# Patient Record
Sex: MALE | Race: BLACK OR AFRICAN AMERICAN
[De-identification: names, ages, dates, MRNs, and addresses within clinical notes are randomized per-mention and may not be internally consistent; named-entity substitution may affect disease eponyms.]

---

## 2018-07-28 ENCOUNTER — HOSPITAL ENCOUNTER (INPATIENT)
Dept: HOSPITAL 74 - JER | Age: 23
Discharge: LEFT BEFORE DISCHARGE | DRG: 605 | End: 2018-07-28
Payer: COMMERCIAL

## 2018-07-28 VITALS — TEMPERATURE: 98.2 F | DIASTOLIC BLOOD PRESSURE: 100 MMHG | SYSTOLIC BLOOD PRESSURE: 146 MMHG | HEART RATE: 102 BPM

## 2018-07-28 VITALS — BODY MASS INDEX: 34.9 KG/M2
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
## 2018-07-28 DIAGNOSIS — S61.213A: Primary | ICD-10-CM
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
## 2018-07-28 DIAGNOSIS — S61.215A: ICD-10-CM
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
## 2018-07-28 NOTE — PDOC
Attending Attestation





- Resident


Resident Name: Marc Mireles





- ED Attending Attestation


I have performed the following: I have examined & evaluated the patient, The 

case was reviewed & discussed with the resident, I agree w/resident's findings 

& plan, Exceptions are as noted





- Medical Decision Making





07/28/18 01:11





22 year old M c/ hx of asthma, right hand dominant p/w left 3rd and 4th digit 

laceration.


Pt was cooking stir ribeiro and cutting when he accidentally cut his left 3rd, 4th 

digit with knife.


Pt sustained injury distal to 3rd DIP with approx 1 cm laceration. Able to flex 

and extend the digit. 


Sustained another laceration distal to 4th DIP approx 1 cm. however, unable to 

flex or extend the digit.


Last tetanus 2013. This occurred prior to arrival.





Pt likely with a tendon injury to the 4th digit.


Will irrigate and clean wound.


Update with tetanus.


IV ancef.


Hand consultation.





<Jaylen Hernandez - Last Filed: 07/28/18 01:11>





- HPI


HPI: 





07/28/18 01:39


The patient is a 22 year old male, with a significant past medical history of 

asthma, who presents to the emergency department with, a laceration of the 3rd 

and 4th digit laceration. As per patient, 20 minutes prior to his arrival he 

was cooking when he cut his fingers with a clean kitchen knife. He denies 

washing the wound after the laceration. His last tetanus shot was in 2013. The 

patient is right hand dominant. 





He denies any recent fevers, chills, headache or dizziness. He denies any 

recent nausea, vomit, diarrhea or constipation. He denies any recent chest pain 

or shortness of breath. He denies any recent dysuria, frequency, urgency or 

hematuria.





Past surgical history: None reported.


Social History: Nonsmoker. Denies EtOH use and recreational drug use. 








- Physicial Exam


PE: 





07/28/18 01:39


GENERAL: Awake, alert, and fully oriented, in no acute distress


HEAD: No signs of trauma


EXTREMITIES: Normal range of motion of bilateral upper and lower extremities.


+RIGHT HAND: 1cm laceration to the 3rd DIP. Able to flex and extend. 1cm 

laceration to the 4th DIP without ability to flex and extend. 2+ radial pulses.


NEUROLOGICAL: Normal speech, normal gait


SKIN: Warm, Dry, normal turgor, no rashes.








- Medical Decision Making








07/28/18 12:57am


Call placed to Dr. Viramontes, plastic surgeon on call, awaiting call back.





1:15am


Call placed to Dr. Birmingham, surgeon on call, awaiting on call back.





1:40am


Second call placed to Dr. Birmingham, surgeon on call, case was discussed. 








<Obdulio Calderón - Last Filed: 07/28/18 01:41>





Attestations





- Attestations





07/28/18 01:41





Documentation prepared by Obdulio Calderón, acting as medical scribe for Jaylen Hernandez MD.





<Obdulio Calderón - Last Filed: 07/28/18 01:41>

## 2018-07-28 NOTE — PDOC
*Physical Exam





- Vital Signs


 Last Vital Signs











Temp Pulse Resp BP Pulse Ox


 


 98.2 F   102 H  20   146/100   98 


 


 07/28/18 00:53  07/28/18 00:53  07/28/18 00:53  07/28/18 00:53  07/28/18 00:53














Medical Decision Making





- Medical Decision Making





07/28/18 01:51





Case discussed with Dr. Birmingham.


He states will likely take patient to OR given likely tendon injury.


He admits to his service.





*DC/Admit/Observation/Transfer


Diagnosis at time of Disposition: 


Hand injury


Qualifiers:


 Encounter type: initial encounter Laterality: left Qualified Code(s): S69.92XA 

- Unspecified injury of left wrist, hand and finger(s), initial encounter








- Discharge Dispostion


Condition at time of disposition: Stable


Decision to Admit order: Yes





- Referrals





- Patient Instructions





- Post Discharge Activity

## 2018-07-28 NOTE — HP
Admitting History and Physical





- Admission


Chief Complaint: laceration finger with flexor tendon


History of Present Illness: 





eloped from ED prior to evaluation and planned surgery 





- Smoking History


Smoking history: Never smoked





- Alcohol/Substance Use


Hx Alcohol Use: No





Physical Examination


Vital Signs: 


 Vital Signs











Temperature  98.2 F   07/28/18 00:53


 


Pulse Rate  102 H  07/28/18 00:53


 


Respiratory Rate  20   07/28/18 00:53


 


Blood Pressure  146/100   07/28/18 00:53


 


O2 Sat by Pulse Oximetry (%)  98   07/28/18 00:53

## 2018-07-28 NOTE — PDOC
History of Present Illness





- General


Chief Complaint: Laceration


Stated Complaint: LACERATION HAND


Time Seen by Provider: 07/28/18 00:57


History Source: Patient


Exam Limitations: No Limitations





- History of Present Illness


Initial Comments: 





07/28/18 00:59


Mr. Hancock is a 21 yo M with a hx of asthma who presents 





Past History





- Suicide/Smoking/Psychosocial Hx


Smoking History: Never smoked


Hx Alcohol Use: No


Drug/Substance Use Hx: No





*Physical Exam





- Vital Signs


 Last Vital Signs











Temp Pulse Resp BP Pulse Ox


 


 98.2 F   102 H  20   146/100   98 


 


 07/28/18 00:53  07/28/18 00:53  07/28/18 00:53  07/28/18 00:53  07/28/18 00:53














Medical Decision Making





- Medical Decision Making





07/28/18 02:36


Pt eloped at approximately 2:30 AM on 07/28/2018. When asked why he wanted to 

leave, he stated to [facility] "I don't want to wait" and proceeded to run out of the 

department through the waiting room out to the streets.





*DC/Admit/Observation/Transfer


Diagnosis at time of Disposition: 


Hand injury


Qualifiers:


 Encounter type: initial encounter Laterality: left Qualified Code(s): S69.92XA 

- Unspecified injury of left wrist, hand and finger(s), initial encounter








- Discharge Dispostion


Disposition: ELOPED





- Referrals





- Patient Instructions





- Post Discharge Activity